# Patient Record
Sex: FEMALE | Race: WHITE | NOT HISPANIC OR LATINO
[De-identification: names, ages, dates, MRNs, and addresses within clinical notes are randomized per-mention and may not be internally consistent; named-entity substitution may affect disease eponyms.]

---

## 2022-05-06 PROBLEM — Z00.00 ENCOUNTER FOR PREVENTIVE HEALTH EXAMINATION: Status: ACTIVE | Noted: 2022-05-06

## 2022-05-26 ENCOUNTER — APPOINTMENT (OUTPATIENT)
Dept: BREAST CENTER | Facility: CLINIC | Age: 57
End: 2022-05-26
Payer: COMMERCIAL

## 2022-05-26 ENCOUNTER — NON-APPOINTMENT (OUTPATIENT)
Age: 57
End: 2022-05-26

## 2022-05-26 PROCEDURE — 99204 OFFICE O/P NEW MOD 45 MIN: CPT

## 2022-11-15 ENCOUNTER — NON-APPOINTMENT (OUTPATIENT)
Age: 57
End: 2022-11-15

## 2023-06-05 ENCOUNTER — NON-APPOINTMENT (OUTPATIENT)
Age: 58
End: 2023-06-05

## 2023-06-05 ENCOUNTER — APPOINTMENT (OUTPATIENT)
Dept: BREAST CENTER | Facility: CLINIC | Age: 58
End: 2023-06-05
Payer: COMMERCIAL

## 2023-06-05 VITALS
DIASTOLIC BLOOD PRESSURE: 71 MMHG | BODY MASS INDEX: 20.06 KG/M2 | HEIGHT: 62 IN | SYSTOLIC BLOOD PRESSURE: 114 MMHG | HEART RATE: 74 BPM | WEIGHT: 109 LBS

## 2023-06-05 DIAGNOSIS — R92.8 OTHER ABNORMAL AND INCONCLUSIVE FINDINGS ON DIAGNOSTIC IMAGING OF BREAST: ICD-10-CM

## 2023-06-05 DIAGNOSIS — Z78.9 OTHER SPECIFIED HEALTH STATUS: ICD-10-CM

## 2023-06-05 DIAGNOSIS — Z80.3 FAMILY HISTORY OF MALIGNANT NEOPLASM OF BREAST: ICD-10-CM

## 2023-06-05 PROCEDURE — 99213 OFFICE O/P EST LOW 20 MIN: CPT

## 2023-06-05 NOTE — PAST MEDICAL HISTORY
[History of Hormone Replacement Treatment] : has no history of hormone replacement treatment [FreeTextEntry7] : n/a [FreeTextEntry8] : yes  Lab: [phone]

## 2023-06-05 NOTE — HISTORY OF PRESENT ILLNESS
[FreeTextEntry1] : Patient is a 59 yo F who presents for breast cancer screening. Previously seen for BI-RADS 3 imaging for L dilated duct w/ some debris found on annual imaging. She has no prior hx of breast bx or surgery. Fhx of breast cancer in maternal second cousin (age 60). Patient denies palpable masses, skin changes, or nipple discharge bilaterally.\par \par WEI Lifetime Risk- 6.1%\par \par 3/27/17: B/l MG ([facility])- heterogenously dense. JOSE. BI-RADS 1\par 8/10/17: B/l US ([facility])- JOSE. BI-RADS 1\par 8/13/18: B/l MG ([facility])- heterogenously dense. JOSE. BI-RADS 1\par 8/28/18: B/l US ([facility])- JOSE. BI-RADS 1\par 3/27/21: B/l MG ([facility])- heterogenously dense. JOSE. BI-RADS 1\par 4/8/21: B/l US ([facility])- JOSE. BI-RADS 1\par 5/4/22: B/l MG ([facility])- heterogenously dense. JOSE. BI-RADS 1\par 6/3/22: MRI- (per pt request) heterogeneously dense, JOSE. BIRADS 1. \par 5/4/22: B/l US ([facility])- L new 0.54cm dilated duct w/ some debris 3:00 3FN. Rec 6m f/u L US. BI-RADS 3\par 11/14/22: L US- L 0.5cm stable hypoechoic oval mass 3:00 3FN. Rec 6m f/u. BI-RADS 3 \par 6/5/23: b/l MG/US ([facility])- heterogeneously dense, US- L 3:00 3FN no significant change in probably benign 0.5 cm circumscribed hypoechoic mass (rec 12M f/u), JOSE. BIRADS 3.

## 2023-06-05 NOTE — PHYSICAL EXAM
[de-identified] : Bilateral breast/axilla/supraclavicular area: No masses, discharge, or adenopathy

## 2024-06-10 ENCOUNTER — APPOINTMENT (OUTPATIENT)
Dept: BREAST CENTER | Facility: CLINIC | Age: 59
End: 2024-06-10
Payer: COMMERCIAL

## 2024-06-10 VITALS
HEART RATE: 65 BPM | BODY MASS INDEX: 20.43 KG/M2 | WEIGHT: 111 LBS | HEIGHT: 62 IN | DIASTOLIC BLOOD PRESSURE: 75 MMHG | SYSTOLIC BLOOD PRESSURE: 116 MMHG

## 2024-06-10 DIAGNOSIS — N64.9 DISORDER OF BREAST, UNSPECIFIED: ICD-10-CM

## 2024-06-10 DIAGNOSIS — R92.30 DENSE BREASTS, UNSPECIFIED: ICD-10-CM

## 2024-06-10 PROCEDURE — 99213 OFFICE O/P EST LOW 20 MIN: CPT

## 2024-06-10 NOTE — PAST MEDICAL HISTORY
[Menarche Age ____] : age at menarche was [unfilled] [Menopause Age____] : age at menopause was [unfilled] [Total Preg ___] : G[unfilled] [Live Births ___] : P[unfilled]  [Abortions ___] : Abortions:[unfilled] [Age At Live Birth ___] : Age at live birth: [unfilled] [History of Hormone Replacement Treatment] : has no history of hormone replacement treatment [FreeTextEntry7] : n/a [FreeTextEntry8] : yes

## 2024-06-10 NOTE — HISTORY OF PRESENT ILLNESS
[FreeTextEntry1] : Patient is a 60yo F who presents for breast cancer screening. Previously seen for BI-RADS 3 imaging for L dilated duct w/ some debris found on annual imaging. She has no prior hx of breast bx or surgery. Fhx of breast cancer in maternal second cousin (age 60). Patient denies palpable masses, skin changes, or nipple discharge bilaterally.  WEI Lifetime Risk- 6.1%  3/27/17: B/l MG ([facility])- heterogeneously dense. JOSE. BI-RADS 1 8/10/17: B/l US ([facility])- JOSE. BI-RADS 1 8/13/18: B/l MG ([facility])- heterogeneously dense. JSOE. BI-RADS 1 8/28/18: B/l US ([facility])- JOSE. BI-RADS 1 3/27/21: B/l MG ([facility])- heterogeneously dense. JOSE. BI-RADS 1 4/8/21: B/l US ([facility])- JOSE. BI-RADS 1 5/4/22: B/l MG ([facility])- heterogeneously dense. JOSE. BI-RADS 1 6/3/22: MRI- (per pt request) heterogeneously dense, JOSE. BIRADS 1.  5/4/22: B/l US ([facility])- L new 0.54cm dilated duct w/ some debris 3:00 3FN. Rec 6m f/u L US. BI-RADS 3 11/14/22: L US- L 0.5cm stable hypoechoic oval mass 3:00 3FN. Rec 6m f/u. BI-RADS 3  6/5/23: B/l MG/US ([facility])- heterogeneously dense, US- L 3:00 3FN no significant change in probably benign 0.5 cm circumscribed hypoechoic mass (rec 12M f/u), JOSE. BIRADS 3. 6/10/24: B/l MG/US-heterogeneously dense, US-0.5 cm L 3:00 3 CFN hypoechoic mass BI-RADS 2

## 2024-06-10 NOTE — PHYSICAL EXAM
[Normocephalic] : normocephalic [EOMI] : extra ocular movement intact [Supple] : supple [No Supraclavicular Adenopathy] : no supraclavicular adenopathy [No Cervical Adenopathy] : no cervical adenopathy [de-identified] : Bilateral breast/axilla/supraclavicular area: No masses, discharge, or adenopathy

## 2025-06-16 ENCOUNTER — NON-APPOINTMENT (OUTPATIENT)
Age: 60
End: 2025-06-16

## 2025-06-16 ENCOUNTER — APPOINTMENT (OUTPATIENT)
Dept: BREAST CENTER | Facility: CLINIC | Age: 60
End: 2025-06-16
Payer: COMMERCIAL

## 2025-06-16 VITALS
DIASTOLIC BLOOD PRESSURE: 78 MMHG | WEIGHT: 112 LBS | BODY MASS INDEX: 21.99 KG/M2 | SYSTOLIC BLOOD PRESSURE: 118 MMHG | HEART RATE: 73 BPM | HEIGHT: 60 IN

## 2025-06-16 PROCEDURE — 99213 OFFICE O/P EST LOW 20 MIN: CPT

## 2025-06-16 RX ORDER — CHROMIUM 200 MCG
TABLET ORAL
Refills: 0 | Status: ACTIVE | COMMUNITY

## 2025-06-16 RX ORDER — COLD-HOT PACK
EACH MISCELLANEOUS
Refills: 0 | Status: ACTIVE | COMMUNITY